# Patient Record
Sex: FEMALE | Race: WHITE
[De-identification: names, ages, dates, MRNs, and addresses within clinical notes are randomized per-mention and may not be internally consistent; named-entity substitution may affect disease eponyms.]

---

## 2018-01-16 ENCOUNTER — HOSPITAL ENCOUNTER (OUTPATIENT)
Dept: HOSPITAL 47 - EC | Age: 68
Setting detail: OBSERVATION
LOS: 2 days | Discharge: HOME | End: 2018-01-18
Attending: SURGERY | Admitting: SURGERY
Payer: MEDICARE

## 2018-01-16 VITALS — BODY MASS INDEX: 17.3 KG/M2

## 2018-01-16 DIAGNOSIS — K57.92: ICD-10-CM

## 2018-01-16 DIAGNOSIS — F17.200: ICD-10-CM

## 2018-01-16 DIAGNOSIS — K20.9: ICD-10-CM

## 2018-01-16 DIAGNOSIS — R82.90: ICD-10-CM

## 2018-01-16 DIAGNOSIS — F10.20: ICD-10-CM

## 2018-01-16 DIAGNOSIS — Z87.01: ICD-10-CM

## 2018-01-16 DIAGNOSIS — Z81.8: ICD-10-CM

## 2018-01-16 DIAGNOSIS — R07.81: ICD-10-CM

## 2018-01-16 DIAGNOSIS — Z88.6: ICD-10-CM

## 2018-01-16 DIAGNOSIS — R68.83: ICD-10-CM

## 2018-01-16 DIAGNOSIS — M54.31: ICD-10-CM

## 2018-01-16 DIAGNOSIS — R11.2: ICD-10-CM

## 2018-01-16 DIAGNOSIS — J44.9: ICD-10-CM

## 2018-01-16 DIAGNOSIS — M54.32: ICD-10-CM

## 2018-01-16 DIAGNOSIS — G89.29: ICD-10-CM

## 2018-01-16 DIAGNOSIS — M54.5: ICD-10-CM

## 2018-01-16 DIAGNOSIS — K29.60: ICD-10-CM

## 2018-01-16 DIAGNOSIS — R10.11: Primary | ICD-10-CM

## 2018-01-16 DIAGNOSIS — Z91.048: ICD-10-CM

## 2018-01-16 DIAGNOSIS — M19.90: ICD-10-CM

## 2018-01-16 DIAGNOSIS — E44.1: ICD-10-CM

## 2018-01-16 DIAGNOSIS — D72.829: ICD-10-CM

## 2018-01-16 LAB
ALBUMIN SERPL-MCNC: 4.8 G/DL (ref 3.5–5)
ALP SERPL-CCNC: 92 U/L (ref 38–126)
ALT SERPL-CCNC: 30 U/L (ref 9–52)
AMYLASE SERPL-CCNC: 47 U/L (ref 30–110)
ANION GAP SERPL CALC-SCNC: 15 MMOL/L
AST SERPL-CCNC: 24 U/L (ref 14–36)
BASOPHILS # BLD AUTO: 0 K/UL (ref 0–0.2)
BASOPHILS NFR BLD AUTO: 0 %
BUN SERPL-SCNC: 11 MG/DL (ref 7–17)
CALCIUM SPEC-MCNC: 10.3 MG/DL (ref 8.4–10.2)
CHLORIDE SERPL-SCNC: 101 MMOL/L (ref 98–107)
CO2 SERPL-SCNC: 22 MMOL/L (ref 22–30)
EOSINOPHIL # BLD AUTO: 0.1 K/UL (ref 0–0.7)
EOSINOPHIL NFR BLD AUTO: 1 %
ERYTHROCYTE [DISTWIDTH] IN BLOOD BY AUTOMATED COUNT: 5.23 M/UL (ref 3.8–5.4)
ERYTHROCYTE [DISTWIDTH] IN BLOOD: 14.2 % (ref 11.5–15.5)
GLUCOSE SERPL-MCNC: 128 MG/DL (ref 74–99)
HCT VFR BLD AUTO: 49.9 % (ref 34–46)
HGB BLD-MCNC: 16.1 GM/DL (ref 11.4–16)
HYALINE CASTS UR QL AUTO: 12 /LPF (ref 0–2)
KETONES UR QL STRIP.AUTO: (no result)
LIPASE SERPL-CCNC: 41 U/L (ref 23–300)
LYMPHOCYTES # SPEC AUTO: 1.2 K/UL (ref 1–4.8)
LYMPHOCYTES NFR SPEC AUTO: 13 %
MCH RBC QN AUTO: 30.9 PG (ref 25–35)
MCHC RBC AUTO-ENTMCNC: 32.3 G/DL (ref 31–37)
MCV RBC AUTO: 95.5 FL (ref 80–100)
MONOCYTES # BLD AUTO: 0.2 K/UL (ref 0–1)
MONOCYTES NFR BLD AUTO: 3 %
NEUTROPHILS # BLD AUTO: 7.6 K/UL (ref 1.3–7.7)
NEUTROPHILS NFR BLD AUTO: 83 %
PH UR: 6 [PH] (ref 5–8)
PLATELET # BLD AUTO: 288 K/UL (ref 150–450)
POTASSIUM SERPL-SCNC: 4.2 MMOL/L (ref 3.5–5.1)
PROT SERPL-MCNC: 8.1 G/DL (ref 6.3–8.2)
RBC UR QL: 29 /HPF (ref 0–5)
SODIUM SERPL-SCNC: 138 MMOL/L (ref 137–145)
SP GR UR: 1.01 (ref 1–1.03)
SQUAMOUS UR QL AUTO: 4 /HPF (ref 0–4)
UROBILINOGEN UR QL STRIP: <2 MG/DL (ref ?–2)
WBC # BLD AUTO: 9.2 K/UL (ref 3.8–10.6)
WBC #/AREA URNS HPF: 20 /HPF (ref 0–5)

## 2018-01-16 PROCEDURE — 43239 EGD BIOPSY SINGLE/MULTIPLE: CPT

## 2018-01-16 PROCEDURE — 74177 CT ABD & PELVIS W/CONTRAST: CPT

## 2018-01-16 PROCEDURE — 83690 ASSAY OF LIPASE: CPT

## 2018-01-16 PROCEDURE — 88342 IMHCHEM/IMCYTCHM 1ST ANTB: CPT

## 2018-01-16 PROCEDURE — 81001 URINALYSIS AUTO W/SCOPE: CPT

## 2018-01-16 PROCEDURE — 85025 COMPLETE CBC W/AUTO DIFF WBC: CPT

## 2018-01-16 PROCEDURE — 96376 TX/PRO/DX INJ SAME DRUG ADON: CPT

## 2018-01-16 PROCEDURE — 96374 THER/PROPH/DIAG INJ IV PUSH: CPT

## 2018-01-16 PROCEDURE — 96375 TX/PRO/DX INJ NEW DRUG ADDON: CPT

## 2018-01-16 PROCEDURE — 87040 BLOOD CULTURE FOR BACTERIA: CPT

## 2018-01-16 PROCEDURE — 80053 COMPREHEN METABOLIC PANEL: CPT

## 2018-01-16 PROCEDURE — 82150 ASSAY OF AMYLASE: CPT

## 2018-01-16 PROCEDURE — 88305 TISSUE EXAM BY PATHOLOGIST: CPT

## 2018-01-16 PROCEDURE — 74249: CPT

## 2018-01-16 PROCEDURE — 96361 HYDRATE IV INFUSION ADD-ON: CPT

## 2018-01-16 PROCEDURE — 99285 EMERGENCY DEPT VISIT HI MDM: CPT

## 2018-01-16 PROCEDURE — 83605 ASSAY OF LACTIC ACID: CPT

## 2018-01-16 PROCEDURE — 36415 COLL VENOUS BLD VENIPUNCTURE: CPT

## 2018-01-16 RX ADMIN — PANTOPRAZOLE SODIUM SCH MG: 40 INJECTION, POWDER, FOR SOLUTION INTRAVENOUS at 21:31

## 2018-01-16 RX ADMIN — HYDROMORPHONE HYDROCHLORIDE PRN MG: 1 INJECTION, SOLUTION INTRAMUSCULAR; INTRAVENOUS; SUBCUTANEOUS at 13:53

## 2018-01-16 RX ADMIN — HYDROMORPHONE HYDROCHLORIDE PRN MG: 1 INJECTION, SOLUTION INTRAMUSCULAR; INTRAVENOUS; SUBCUTANEOUS at 17:44

## 2018-01-16 RX ADMIN — HYDROMORPHONE HYDROCHLORIDE PRN MG: 1 INJECTION, SOLUTION INTRAMUSCULAR; INTRAVENOUS; SUBCUTANEOUS at 21:32

## 2018-01-16 RX ADMIN — CEFAZOLIN SCH MLS/HR: 330 INJECTION, POWDER, FOR SOLUTION INTRAMUSCULAR; INTRAVENOUS at 12:55

## 2018-01-16 RX ADMIN — PANTOPRAZOLE SODIUM SCH MG: 40 INJECTION, POWDER, FOR SOLUTION INTRAVENOUS at 14:30

## 2018-01-16 NOTE — P.GSHP
History of Present Illness


H&P Date: 18





67-year-old thin fragile female looking older than stated age underweight 

presented to the emergency room with a chief complaint of right upper quadrant 

abdominal pain.  Patient stated he had been ongoing for the past day.  Felt 

nauseated with vomiting.  Patient gives a history of having diverticulitis.  

Patient had a CAT scan of the abdomen and pelvic varices.  Mildly dilated 

duodenum stricture or mass needs to be considered.  Patient gives a history of 

alcohol consumption states that she drinks daily last drink was 48 hours ago.  

Patient states she has been having right lower rib area pain with tenderness.  

Patient states that she smokes marijuana daily and is an active current every 

day smoker.  Patient states it's been over 20 years since she's had a 

colonoscopy.





Past medical history COPD, osteoarthritis, chronic lower back pain.  Past 

surgical history orthopedic procedures.





- Review of Systems


Comment: 





Essentially unremarkable except as mentioned in the present illness





Past Medical History


Past Medical History: COPD, Osteoarthritis (OA), Pneumonia


Additional Past Medical History / Comment(s): Chronic low  back pain, DDD, 

bulging/herniated discs, sciatica bilaterally at times, diverticulitis, 

bilateral tinnitis at times, past R hip fracture after slip/fall on ice with 

surgery.


History of Any Multi-Drug Resistant Organisms: None Reported


Past Surgical History: Joint Replacement, Orthopedic Surgery


Additional Past Surgical History / Comment(s): R hip hemiarthroplasty, R knee 

arthroscopy, R/L hand cyst removals, bilateral carpal tunnel releases, 

colonoscopy-normal.


Past Anesthesia/Blood Transfusion Reactions: No Reported Reaction


Additional Past Anesthesia/Blood Transfusion Reaction / Comment(s): Pt has 

never received blood.


Smoking Status: Current every day smoker





- Past Family History


  ** Father


Additional Family Medical History / Comment(s): Father  of a head injury.





  ** Mother


Family Medical History: Dementia


Additional Family Medical History / Comment(s): Mother is 89yrs old and has 

dementia.





Medications and Allergies


 Home Medications











 Medication  Instructions  Recorded  Confirmed  Type


 


HYDROcodone/APAP 10-325MG [Norco 1 tab PO DAILY PRN 16 History





]    











 Allergies











Allergy/AdvReac Type Severity Reaction Status Date / Time


 


ibuprofen [From Motrin] AdvReac  Abdominal Verified 18 08:59





   Pain  


 


iodine AdvReac  Abdominal Verified 18 08:59





   Pain  














Surgical - Exam


 Vital Signs











Temp Pulse Resp BP Pulse Ox


 


 97.0 F L  102 H  20   222/115   97 


 


 18 08:31  18 08:31  18 08:31  18 08:31  18 08:31














GENERAL APPEARANCE: 67 -year-old female patient is alert, oriented,x 3  in no 

acute distress.


VITAL SIGNS: Reviewed


HEENT: Head is normocephalic and atraumatic. Pupils are equal and reactive. The 

nares are patent. Oropharynx is clear without lesions.


NECK: Supple without lymphadenopathy. Traches midline.


HEART: S1, S2. Regular rate and rhythm.  Denies chest pain no murmur


LUNGS: No crackles or wheezes are heard.  No shortness of breath


ABDOMEN: Soft, tenderness in the epigastric area nondistended with good bowel 

sounds. No peritoneal signs. No palpable organomegaly or masses.


EXTREMITIES: Normal skin color and turgor. No cyanosis, rash, ulceration, 

clubbing or edema. Radial pedal pulses are 2/4 bilaterally.


NEUROLOGICAL: No focal deficits. Strength and sensation are grossly intact.








Results





- Labs





 18 09:00





 18 09:00


 Abnormal Lab Results - Last 24 Hours (Table)











  18 Range/Units





  09:00 09:00 09:00 


 


Hgb   16.1 H   (11.4-16.0)  gm/dL


 


Hct   49.9 H   (34.0-46.0)  %


 


Glucose  128 H    (74-99)  mg/dL


 


Calcium  10.3 H    (8.4-10.2)  mg/dL


 


Urine Appearance    Cloudy H  (Clear)  


 


Urine Protein    Trace H  (Negative)  


 


Urine Ketones    2+ H  (Negative)  


 


Urine Blood    Moderate H  (Negative)  


 


Ur Leukocyte Esterase    Large H  (Negative)  


 


Urine RBC    29 H  (0-5)  /hpf


 


Urine WBC    20 H  (0-5)  /hpf


 


Urine Bacteria    Occasional H  (None)  /hpf


 


Hyaline Casts    12 H  (0-2)  /lpf


 


Urine Mucus    Few H  (None)  /hpf








 Diabetes panel











  18 Range/Units





  09:00 


 


Sodium  138  (137-145)  mmol/L


 


Potassium  4.2  (3.5-5.1)  mmol/L


 


Chloride  101  ()  mmol/L


 


Carbon Dioxide  22  (22-30)  mmol/L


 


BUN  11  (7-17)  mg/dL


 


Creatinine  0.68  (0.52-1.04)  mg/dL


 


Glucose  128 H  (74-99)  mg/dL


 


Calcium  10.3 H  (8.4-10.2)  mg/dL


 


AST  24  (14-36)  U/L


 


ALT  30  (9-52)  U/L


 


Alkaline Phosphatase  92  ()  U/L


 


Total Protein  8.1  (6.3-8.2)  g/dL


 


Albumin  4.8  (3.5-5.0)  g/dL








 Calcium panel











  18 Range/Units





  09:00 


 


Calcium  10.3 H  (8.4-10.2)  mg/dL


 


Albumin  4.8  (3.5-5.0)  g/dL








 Pituitary panel











  18 Range/Units





  09:00 


 


Sodium  138  (137-145)  mmol/L


 


Potassium  4.2  (3.5-5.1)  mmol/L


 


Chloride  101  ()  mmol/L


 


Carbon Dioxide  22  (22-30)  mmol/L


 


BUN  11  (7-17)  mg/dL


 


Creatinine  0.68  (0.52-1.04)  mg/dL


 


Glucose  128 H  (74-99)  mg/dL


 


Calcium  10.3 H  (8.4-10.2)  mg/dL








 Adrenal panel











  18 Range/Units





  09:00 


 


Sodium  138  (137-145)  mmol/L


 


Potassium  4.2  (3.5-5.1)  mmol/L


 


Chloride  101  ()  mmol/L


 


Carbon Dioxide  22  (22-30)  mmol/L


 


BUN  11  (7-17)  mg/dL


 


Creatinine  0.68  (0.52-1.04)  mg/dL


 


Glucose  128 H  (74-99)  mg/dL


 


Calcium  10.3 H  (8.4-10.2)  mg/dL


 


Total Bilirubin  1.2  (0.2-1.3)  mg/dL


 


AST  24  (14-36)  U/L


 


ALT  30  (9-52)  U/L


 


Alkaline Phosphatase  92  ()  U/L


 


Total Protein  8.1  (6.3-8.2)  g/dL


 


Albumin  4.8  (3.5-5.0)  g/dL














Assessment and Plan


Assessment: 





Impression


Present on admission right upper quadrant pain with nausea vomiting


Possible UTI


Daily alcohol consumption last drink 48 hours


Daily marijuana use


Current active tobacco abuse


CAT scan of the abdomen possible stricture in the DRM with a mass in the 

duodenum not ruled out











Plan


EGD will be planned in the morning


Home meds appropriate


DVT and GI prophylaxis


Clear liquid diet


Further recommendations after EGD 


Pain control


IV fluid for hydration


Dietitian consult underweight nutritional supplements





Dictating for Dr. keating





The above impression and plan of care have been discussed and directed by 

signing physician. Christy Kidd nurse practitioner acting as scribe for signing 

physician.

## 2018-01-16 NOTE — ED
General Adult HPI





- General


Chief complaint: Abdominal Pain


Stated complaint: Rt rib pain


Time Seen by Provider: 01/16/18 08:45


Source: patient, RN notes reviewed


Mode of arrival: wheelchair


Limitations: no limitations





- History of Present Illness


Initial comments: 





68 yo female presents to the ER with cc of right upper quadrant abdominal pain.

  She has had this since 8 AM yesterday.  She states she's had nausea vomiting.

  She has a history of diverticulitis and states it feels much like that.  She 

states she's been unable to keep really anything down.  She's been drinking 

water.  There's been no diarrhea.  She does not think that she's had a fever 

but she has had the chills.  Patient was concerned due to her continued 

symptoms so she thought that she should be evaluated.Patient denies any recent 

fever, chills, shortness of breath, chest pain, back pain, numbness or tingling

, dysuria or hematuria, constipation or diarrhea, headaches or visual changes, 

or any other current symptoms.





- Related Data


 Home Medications











 Medication  Instructions  Recorded  Confirmed


 


HYDROcodone/APAP 10-325MG [Norco 1 tab PO DAILY PRN 07/29/16 01/16/18





]   











 Allergies











Allergy/AdvReac Type Severity Reaction Status Date / Time


 


ibuprofen [From Motrin] AdvReac  Abdominal Verified 01/16/18 08:59





   Pain  


 


iodine AdvReac  Abdominal Verified 01/16/18 08:59





   Pain  














Review of Systems


ROS Statement: 


Those systems with pertinent positive or pertinent negative responses have been 

documented in the HPI.





ROS Other: All systems not noted in ROS Statement are negative.





Past Medical History


Additional Past Medical History / Comment(s): chronic back pain, diverticulitis


History of Any Multi-Drug Resistant Organisms: None Reported


Past Surgical History: Joint Replacement, Orthopedic Surgery


Past Psychological History: No Psychological Hx Reported


Smoking Status: Current every day smoker


Past Alcohol Use History: Daily


Past Drug Use History: None Reported





General Exam





- General Exam Comments


Initial Comments: 





General:  The patient is awake and alert, in no distress, and does not appear 

acutely ill. 


Eye:  Pupils are equal, round and reactive to light, extra-ocular movements are 

intact; there is normal conjunctiva bilaterally.  No signs of icterus.  


Ears, nose, mouth and throat:  There are moist mucous membranes.


Neck:  The neck is supple, there is no tenderness.


Cardiovascular:  There is a regular rate and rhythm. No murmur, rub or gallop 

is appreciated.


Respiratory:  Lungs are clear to auscultation, respirations are non-labored, 

breath sounds are equal.  No wheezes, stridor, rales, or rhonchi.


Gastrointestinal:  Soft, non-distended, mild tenderness right upper quadrant of 

the abdomen without masses or organomegaly noted. There is no rebound or 

guarding present.  No CVA tenderness. Bowel sounds are unremarkable.


Back:  There is no tenderness to palpation in the midline. There is no obvious 

deformity. No rashes noted. 


Musculoskeletal:  Normal ROM, no tenderness, There is no pedal edema. There is 

no calf tenderness or swelling. Sensation intact. Pulses equal bilaterally 2+.  


Neurological:  CN II-XII intact, There are no obvious motor or sensory 

deficits. Coordination appears grossly intact. Speech is normal.


Skin:  Skin is warm and dry and no rashes or lesions are noted. 


Psychiatric:  Cooperative, appropriate mood & affect, normal judgment.  





Limitations: no limitations





Course


 Vital Signs











  01/16/18 01/16/18 01/16/18





  08:31 08:35 09:12


 


Temperature 97.0 F L  


 


Pulse Rate 102 H  73


 


Respiratory 20  16





Rate   


 


Blood Pressure 222/115 219/102 225/105


 


O2 Sat by Pulse 97  96





Oximetry   














  01/16/18 01/16/18





  10:25 11:30


 


Temperature  


 


Pulse Rate 70 76


 


Respiratory 16 16





Rate  


 


Blood Pressure 199/99 198/95


 


O2 Sat by Pulse 99 98





Oximetry  














Medical Decision Making





- Medical Decision Making





67-year-old female presents emergency department with a chief complaint of 

abdominal pain with history of diverticulitis.  At this time patient's CAT scan 

is reviewed.  Urinalysis does show suspicion for UTI would give her Rocephin.  

She symptoms are most consistent with pyelonephritis however we will treat.  

Patient also does appear to have this to are not bloody.  We discussed the 

patient is could've been different etiologies.  We discussed the risk of cancer 

with this.  We did discuss that she does seem to be admitted to the hospital 

for continued care and evaluation.  We discussed we will continue antibiotics.  

We did discuss that due to the vomited there is concern this could be a partial 

obstruction with this.  Patient at this time was admitted to on-call care.  

[facility] accepts admission.





- Lab Data


Result diagrams: 


 01/16/18 09:00





 01/16/18 09:00


 Lab Results











  01/16/18 01/16/18 01/16/18 Range/Units





  09:00 09:00 09:00 


 


WBC   9.2   (3.8-10.6)  k/uL


 


RBC   5.23   (3.80-5.40)  m/uL


 


Hgb   16.1 H   (11.4-16.0)  gm/dL


 


Hct   49.9 H   (34.0-46.0)  %


 


MCV   95.5   (80.0-100.0)  fL


 


MCH   30.9   (25.0-35.0)  pg


 


MCHC   32.3   (31.0-37.0)  g/dL


 


RDW   14.2   (11.5-15.5)  %


 


Plt Count   288   (150-450)  k/uL


 


Neutrophils %   83   %


 


Lymphocytes %   13   %


 


Monocytes %   3   %


 


Eosinophils %   1   %


 


Basophils %   0   %


 


Neutrophils #   7.6   (1.3-7.7)  k/uL


 


Lymphocytes #   1.2   (1.0-4.8)  k/uL


 


Monocytes #   0.2   (0-1.0)  k/uL


 


Eosinophils #   0.1   (0-0.7)  k/uL


 


Basophils #   0.0   (0-0.2)  k/uL


 


Sodium  138    (137-145)  mmol/L


 


Potassium  4.2    (3.5-5.1)  mmol/L


 


Chloride  101    ()  mmol/L


 


Carbon Dioxide  22    (22-30)  mmol/L


 


Anion Gap  15    mmol/L


 


BUN  11    (7-17)  mg/dL


 


Creatinine  0.68    (0.52-1.04)  mg/dL


 


Est GFR (MDRD) Af Amer  >60    (>60 ml/min/1.73 sqM)  


 


Est GFR (MDRD) Non-Af  >60    (>60 ml/min/1.73 sqM)  


 


Glucose  128 H    (74-99)  mg/dL


 


Plasma Lactic Acid Zak    1.3  (0.7-2.0)  mmol/L


 


Calcium  10.3 H    (8.4-10.2)  mg/dL


 


Total Bilirubin  1.2    (0.2-1.3)  mg/dL


 


AST  24    (14-36)  U/L


 


ALT  30    (9-52)  U/L


 


Alkaline Phosphatase  92    ()  U/L


 


Total Protein  8.1    (6.3-8.2)  g/dL


 


Albumin  4.8    (3.5-5.0)  g/dL


 


Amylase  47    ()  U/L


 


Lipase  41    ()  U/L


 


Urine Color     


 


Urine Appearance     (Clear)  


 


Urine pH     (5.0-8.0)  


 


Ur Specific Gravity     (1.001-1.035)  


 


Urine Protein     (Negative)  


 


Urine Glucose (UA)     (Negative)  


 


Urine Ketones     (Negative)  


 


Urine Blood     (Negative)  


 


Urine Nitrite     (Negative)  


 


Urine Bilirubin     (Negative)  


 


Urine Urobilinogen     (<2.0)  mg/dL


 


Ur Leukocyte Esterase     (Negative)  


 


Urine RBC     (0-5)  /hpf


 


Urine WBC     (0-5)  /hpf


 


Ur Squamous Epith Cells     (0-4)  /hpf


 


Urine Bacteria     (None)  /hpf


 


Hyaline Casts     (0-2)  /lpf


 


Urine Mucus     (None)  /hpf














  01/16/18 Range/Units





  09:00 


 


WBC   (3.8-10.6)  k/uL


 


RBC   (3.80-5.40)  m/uL


 


Hgb   (11.4-16.0)  gm/dL


 


Hct   (34.0-46.0)  %


 


MCV   (80.0-100.0)  fL


 


MCH   (25.0-35.0)  pg


 


MCHC   (31.0-37.0)  g/dL


 


RDW   (11.5-15.5)  %


 


Plt Count   (150-450)  k/uL


 


Neutrophils %   %


 


Lymphocytes %   %


 


Monocytes %   %


 


Eosinophils %   %


 


Basophils %   %


 


Neutrophils #   (1.3-7.7)  k/uL


 


Lymphocytes #   (1.0-4.8)  k/uL


 


Monocytes #   (0-1.0)  k/uL


 


Eosinophils #   (0-0.7)  k/uL


 


Basophils #   (0-0.2)  k/uL


 


Sodium   (137-145)  mmol/L


 


Potassium   (3.5-5.1)  mmol/L


 


Chloride   ()  mmol/L


 


Carbon Dioxide   (22-30)  mmol/L


 


Anion Gap   mmol/L


 


BUN   (7-17)  mg/dL


 


Creatinine   (0.52-1.04)  mg/dL


 


Est GFR (MDRD) Af Amer   (>60 ml/min/1.73 sqM)  


 


Est GFR (MDRD) Non-Af   (>60 ml/min/1.73 sqM)  


 


Glucose   (74-99)  mg/dL


 


Plasma Lactic Acid Zak   (0.7-2.0)  mmol/L


 


Calcium   (8.4-10.2)  mg/dL


 


Total Bilirubin   (0.2-1.3)  mg/dL


 


AST   (14-36)  U/L


 


ALT   (9-52)  U/L


 


Alkaline Phosphatase   ()  U/L


 


Total Protein   (6.3-8.2)  g/dL


 


Albumin   (3.5-5.0)  g/dL


 


Amylase   ()  U/L


 


Lipase   ()  U/L


 


Urine Color  Light Red  


 


Urine Appearance  Cloudy H  (Clear)  


 


Urine pH  6.0  (5.0-8.0)  


 


Ur Specific Gravity  1.014  (1.001-1.035)  


 


Urine Protein  Trace H  (Negative)  


 


Urine Glucose (UA)  Negative  (Negative)  


 


Urine Ketones  2+ H  (Negative)  


 


Urine Blood  Moderate H  (Negative)  


 


Urine Nitrite  Negative  (Negative)  


 


Urine Bilirubin  Negative  (Negative)  


 


Urine Urobilinogen  <2.0  (<2.0)  mg/dL


 


Ur Leukocyte Esterase  Large H  (Negative)  


 


Urine RBC  29 H  (0-5)  /hpf


 


Urine WBC  20 H  (0-5)  /hpf


 


Ur Squamous Epith Cells  4  (0-4)  /hpf


 


Urine Bacteria  Occasional H  (None)  /hpf


 


Hyaline Casts  12 H  (0-2)  /lpf


 


Urine Mucus  Few H  (None)  /hpf














- Radiology Data


Radiology results: report reviewed, image reviewed





Disposition


Clinical Impression: 


 UTI (urinary tract infection), Duodenal mass





Disposition: ADMITTED AS IP TO [facility]


Condition: Stable


Referrals: 


None,Stated [Primary Care Provider] - 1-2 days


Time of Disposition: 11:18


Decision Date: 01/16/18


Decision Time: 11:18

## 2018-01-16 NOTE — P.CONS
History of Present Illness





- Reason for Consult


Possible urinary tract infection and peptic ulcer disease





- History of Present Illness





68 yo female presents to the ER with cc of right upper quadrant abdominal pain.

  She has had this since 8 AM yesterday.  She states she's had nausea vomiting.

  She has a history of diverticulitis and states it feels much like that.  She 

states she's been unable to keep really anything down.  She's been drinking 

water.  There's been no diarrhea.  She does not think that she's had a fever 

but she has had the chills.   Patient's pain  is in the right lower rib cage 

area and there is tenderness in that area, pain is about 7 x 10 now sharp in 

nature radiating to the back much worse earlier today.





Patient had a CAT scan of the abdomen which showed possible stricture in the 

DRM are a mass in the duodenum with proximal dilated patient.  Patient was 

started on Protonix patient will continue on IV fluids patient denied any sick 

and weight loss there was suspicion of or pyelonephritis on the CAT scan of the 

right lower pole of the kidney although there is no significant 70 evidence of 

urinary tract infection urine has elevated white blood cell count nitrate is 

negative leukocyte esterase is positive.  Patient denied any dysuria suprapubic 

pain, since I cannot completely rule out urinary tract infection and started on 

Rocephin which will be continued.





Review of Systems


REVIEW OF SYSTEMS: 


CONSTITUTIONAL: No fever, no malaise, no fatigue. 


HEENT: No recent visual problems or hearing problems. Denied any sore throat. 


CARDIOVASCULAR: No chest pain, orthopnea, PND, no palpitations, no syncope. 


PULMONARY: No shortness of breath, no cough, no hemoptysis. 


GASTROINTESTINAL: As mentioned in HPI


NEUROLOGICAL: No headaches, no weakness, no numbness. 


HEMATOLOGICAL: Denies any bleeding or petechiae. 


GENITOURINARY: Denies any burning micturition, frequency, or urgency. 


MUSCULOSKELETAL/RHEUMATOLOGICAL: Denies any joint pain, swelling, or any muscle 

pain. 


ENDOCRINE: Denies any polyuria or polydipsia. 





The rest of the 14-point review of systems is negative.








Past Medical History


Past Medical History: COPD, Osteoarthritis (OA), Pneumonia


Additional Past Medical History / Comment(s): Chronic low  back pain, DDD, 

bulging/herniated discs, sciatica bilaterally at times, diverticulitis, 

bilateral tinnitis at times, past R hip fracture after slip/fall on ice with 

surgery.


History of Any Multi-Drug Resistant Organisms: None Reported


Past Surgical History: Joint Replacement, Orthopedic Surgery


Additional Past Surgical History / Comment(s): R hip hemiarthroplasty, R knee 

arthroscopy, R/L hand cyst removals, bilateral carpal tunnel releases, 

colonoscopy-normal.


Past Anesthesia/Blood Transfusion Reactions: No Reported Reaction


Additional Past Anesthesia/Blood Transfusion Reaction / Comm: Pt has never 

received blood.


Smoking Status: Current every day smoker





- Past Family History


  ** Father


Additional Family Medical History / Comment(s): Father  of a head injury.





  ** Mother


Family Medical History: Dementia


Additional Family Medical History / Comment(s): Mother is 89yrs old and has 

dementia.





Medications and Allergies


 Home Medications











 Medication  Instructions  Recorded  Confirmed  Type


 


HYDROcodone/APAP 10-325MG [Norco 1 tab PO DAILY PRN 16 History





]    











 Allergies











Allergy/AdvReac Type Severity Reaction Status Date / Time


 


ibuprofen [From Motrin] AdvReac  Abdominal Verified 18 08:59





   Pain  


 


iodine AdvReac  Abdominal Verified 18 08:59





   Pain  














Physical Exam


Vitals: 


 Vital Signs











  Temp Pulse Resp BP Pulse Ox


 


 18 12:28  98.4 F  71  16  183/89  98


 


 18 11:30   76  16  198/95  98


 


 18 10:25   70  16  199/99  99


 


 18 09:12   73  16  225/105  96


 


 18 08:35     219/102 


 


 18 08:31  97.0 F L  102 H  20  222/115  97








 Intake and Output











 01/15/18 01/16/18 01/16/18





 22:59 06:59 14:59


 


Other:   


 


  Weight   45.813 kg








 Patient Weight











 18





 06:59


 


Weight 45.813 kg














PHYSICAL EXAMINATION: 





GENERAL: The patient is alert and oriented x3, not in any acute distress.  Thin 

built female


HEENT: Pupils are round and equally reacting to light. EOMI. No scleral 

icterus. No conjunctival pallor. Normocephalic, atraumatic. No pharyngeal 

erythema. No thyromegaly. 


CARDIOVASCULAR: S1 and S2 present. No murmurs, rubs, or gallops. 


PULMONARY: Chest is clear to auscultation, no wheezing or crackles. 


ABDOMEN: Soft,  nondistended, normoactive bowel sounds. No palpable 

organomegaly.  Patient has tenderness in the right lower rib cage area


MUSCULOSKELETAL: No joint swelling or deformity.


EXTREMITIES: No cyanosis, clubbing, or pedal edema. 


NEUROLOGICAL: Gross neurological examination did not reveal any focal deficits. 


SKIN: No rashes. 








Results


CBC & Chem 7: 


 18 09:00





 18 09:00


Labs: 


 Abnormal Lab Results - Last 24 Hours (Table)











  18 Range/Units





  09:00 09:00 09:00 


 


Hgb   16.1 H   (11.4-16.0)  gm/dL


 


Hct   49.9 H   (34.0-46.0)  %


 


Glucose  128 H    (74-99)  mg/dL


 


Calcium  10.3 H    (8.4-10.2)  mg/dL


 


Urine Appearance    Cloudy H  (Clear)  


 


Urine Protein    Trace H  (Negative)  


 


Urine Ketones    2+ H  (Negative)  


 


Urine Blood    Moderate H  (Negative)  


 


Ur Leukocyte Esterase    Large H  (Negative)  


 


Urine RBC    29 H  (0-5)  /hpf


 


Urine WBC    20 H  (0-5)  /hpf


 


Urine Bacteria    Occasional H  (None)  /hpf


 


Hyaline Casts    12 H  (0-2)  /lpf


 


Urine Mucus    Few H  (None)  /hpf














Assessment and Plan


Plan: 


-Abdominal pain nausea vomiting: Probably peptic ulcer disease with a stricture 

patient was started on Protonix further management as per primary surgical 

services.


-Possibility of urinary tract infection cannot be ruled out urine cultures were 

obtained patient was started on Rocephin.


-Nicotine abuse history: Counseling was provided

## 2018-01-17 VITALS — RESPIRATION RATE: 16 BRPM

## 2018-01-17 LAB
ALBUMIN SERPL-MCNC: 4 G/DL (ref 3.5–5)
ALP SERPL-CCNC: 62 U/L (ref 38–126)
ALT SERPL-CCNC: 28 U/L (ref 9–52)
ANION GAP SERPL CALC-SCNC: 8 MMOL/L
AST SERPL-CCNC: 21 U/L (ref 14–36)
BASOPHILS # BLD AUTO: 0 K/UL (ref 0–0.2)
BASOPHILS NFR BLD AUTO: 0 %
BUN SERPL-SCNC: 12 MG/DL (ref 7–17)
CALCIUM SPEC-MCNC: 9.6 MG/DL (ref 8.4–10.2)
CHLORIDE SERPL-SCNC: 102 MMOL/L (ref 98–107)
CO2 SERPL-SCNC: 30 MMOL/L (ref 22–30)
EOSINOPHIL # BLD AUTO: 0 K/UL (ref 0–0.7)
EOSINOPHIL NFR BLD AUTO: 0 %
ERYTHROCYTE [DISTWIDTH] IN BLOOD BY AUTOMATED COUNT: 4.5 M/UL (ref 3.8–5.4)
ERYTHROCYTE [DISTWIDTH] IN BLOOD: 12.6 % (ref 11.5–15.5)
GLUCOSE SERPL-MCNC: 102 MG/DL (ref 74–99)
HCT VFR BLD AUTO: 43.9 % (ref 34–46)
HGB BLD-MCNC: 14 GM/DL (ref 11.4–16)
LYMPHOCYTES # SPEC AUTO: 2.4 K/UL (ref 1–4.8)
LYMPHOCYTES NFR SPEC AUTO: 26 %
MCH RBC QN AUTO: 31.1 PG (ref 25–35)
MCHC RBC AUTO-ENTMCNC: 31.9 G/DL (ref 31–37)
MCV RBC AUTO: 97.5 FL (ref 80–100)
MONOCYTES # BLD AUTO: 0.6 K/UL (ref 0–1)
MONOCYTES NFR BLD AUTO: 7 %
NEUTROPHILS # BLD AUTO: 6.2 K/UL (ref 1.3–7.7)
NEUTROPHILS NFR BLD AUTO: 65 %
PLATELET # BLD AUTO: 265 K/UL (ref 150–450)
POTASSIUM SERPL-SCNC: 4.1 MMOL/L (ref 3.5–5.1)
PROT SERPL-MCNC: 6.9 G/DL (ref 6.3–8.2)
SODIUM SERPL-SCNC: 140 MMOL/L (ref 137–145)
WBC # BLD AUTO: 9.5 K/UL (ref 3.8–10.6)

## 2018-01-17 RX ADMIN — HYDROMORPHONE HYDROCHLORIDE PRN MG: 1 INJECTION, SOLUTION INTRAMUSCULAR; INTRAVENOUS; SUBCUTANEOUS at 08:24

## 2018-01-17 RX ADMIN — HYDROMORPHONE HYDROCHLORIDE PRN MG: 1 INJECTION, SOLUTION INTRAMUSCULAR; INTRAVENOUS; SUBCUTANEOUS at 04:56

## 2018-01-17 RX ADMIN — HYDROMORPHONE HYDROCHLORIDE PRN MG: 1 INJECTION, SOLUTION INTRAMUSCULAR; INTRAVENOUS; SUBCUTANEOUS at 14:27

## 2018-01-17 RX ADMIN — HYDROMORPHONE HYDROCHLORIDE PRN MG: 1 INJECTION, SOLUTION INTRAMUSCULAR; INTRAVENOUS; SUBCUTANEOUS at 11:42

## 2018-01-17 RX ADMIN — PANTOPRAZOLE SODIUM SCH MG: 40 INJECTION, POWDER, FOR SOLUTION INTRAVENOUS at 08:20

## 2018-01-17 RX ADMIN — HYDROMORPHONE HYDROCHLORIDE PRN MG: 1 INJECTION, SOLUTION INTRAMUSCULAR; INTRAVENOUS; SUBCUTANEOUS at 21:07

## 2018-01-17 RX ADMIN — CEFTRIAXONE SODIUM SCH MG: 1 INJECTION, POWDER, FOR SOLUTION INTRAMUSCULAR; INTRAVENOUS at 11:40

## 2018-01-17 RX ADMIN — CEFAZOLIN SCH MLS/HR: 330 INJECTION, POWDER, FOR SOLUTION INTRAMUSCULAR; INTRAVENOUS at 00:04

## 2018-01-17 RX ADMIN — CEFAZOLIN SCH MLS/HR: 330 INJECTION, POWDER, FOR SOLUTION INTRAMUSCULAR; INTRAVENOUS at 09:06

## 2018-01-17 RX ADMIN — HYDROMORPHONE HYDROCHLORIDE PRN MG: 1 INJECTION, SOLUTION INTRAMUSCULAR; INTRAVENOUS; SUBCUTANEOUS at 17:28

## 2018-01-17 RX ADMIN — CEFAZOLIN SCH MLS/HR: 330 INJECTION, POWDER, FOR SOLUTION INTRAMUSCULAR; INTRAVENOUS at 17:14

## 2018-01-17 RX ADMIN — HYDROMORPHONE HYDROCHLORIDE PRN MG: 1 INJECTION, SOLUTION INTRAMUSCULAR; INTRAVENOUS; SUBCUTANEOUS at 00:11

## 2018-01-17 RX ADMIN — PANTOPRAZOLE SODIUM SCH MG: 40 INJECTION, POWDER, FOR SOLUTION INTRAVENOUS at 21:07

## 2018-01-17 NOTE — P.PN
Subjective


Patient was admitted for abdominal pain found to have some duodenal stricture 

or peptic ulcer disease.  Patient is feeling better is going undergoing  upper 

GI endoscopy after that patient probably will be discharged.  Still complaining 

of same some pain in the abdomen.  Patient is nothing by mouth at this time





Constitutional: Denied any fatigue denied any fever.


Cardio vascular: denied any chest pain, palpitations


Gastrointestinal as mentioned in HPI


Pulmonary: Denied any shortness of breath cough


Neurologic denied any new focal deficits








Objective





- Vital Signs


Vital signs: 


 Vital Signs











Temp  97.6 F   01/17/18 06:22


 


Pulse  65   01/17/18 06:22


 


Resp  16   01/17/18 06:22


 


BP  149/83   01/17/18 06:22


 


Pulse Ox  96   01/17/18 06:22








 Intake & Output











 01/16/18 01/17/18 01/17/18





 18:59 06:59 18:59


 


Weight 45.813 kg 46.5 kg 


 


Other:   


 


  Voiding Method  Toilet 


 


  # Voids 1 1 


 


  # Bowel Movements 0  














- Exam


PHYSICAL EXAMINATION: 





GENERAL: The patient is alert and oriented x3, not in any acute distress.  Thin 

built female


HEENT: Pupils are round and equally reacting to light. EOMI. No scleral 

icterus. No conjunctival pallor. Normocephalic, atraumatic. No pharyngeal 

erythema. No thyromegaly. 


CARDIOVASCULAR: S1 and S2 present. No murmurs, rubs, or gallops. 


PULMONARY: Chest is clear to auscultation, no wheezing or crackles. 


ABDOMEN: Soft,  nondistended, normoactive bowel sounds. No palpable 

organomegaly.  Patient has tenderness in the right lower rib cage area


MUSCULOSKELETAL: No joint swelling or deformity.


EXTREMITIES: No cyanosis, clubbing, or pedal edema. 


NEUROLOGICAL: Gross neurological examination did not reveal any focal deficits. 


SKIN: No rashes. 








- Labs


CBC & Chem 7: 


 01/17/18 07:29





 01/17/18 07:29


Labs: 


 Abnormal Lab Results - Last 24 Hours (Table)











  01/17/18 Range/Units





  07:29 


 


Glucose  102 H  (74-99)  mg/dL














Assessment and Plan


Plan: 


-Abdominal pain nausea vomiting: Probably peptic ulcer disease with a stricture 


-Possibility of urinary tract infection cannot be ruled out be discharged on 30 

days of ciprofloxacin 500 twice a day


-Nicotine abuse history: Counseling was provided

## 2018-01-17 NOTE — P.OP
Date of Procedure: 01/17/18


Preoperative Diagnosis: 


Dysphagia, peptic ulcer disease


Postoperative Diagnosis: 


Mild antral gastritis





No evidence of hiatal hernia





Mild esophagitis


Procedure(s) Performed: 


EGD


Anesthesia: MAC


Surgeon: Miguel Wong


Pathology: other (Antrum, esophagus)


Condition: stable


Disposition: PACU


Description of Procedure: 


The patient's placed on the endoscopy table in the lateral position.  She 

received IV sedation.  The gastroscope placed oropharynx and passed in the 

esophagus and stomach.  Scope was then placed through the pylorus.  The first 

and second portion of the duodenum.  Scope was then advanced up to the 70 cm 

dannielle and no obvious duodenal tumor could be seen.  Scope was then withdrawn the 

antrum appeared mildly inflamed.  A biopsies performed.  The scope was then 

retroflexed and remainder stomach appeared normal.  There is no evidence of a 

hiatal hernia.  GE junction was at 47 is.  The distal esophagus was minimal 

inflamed and a biopsies performed.  The proximal esophagus appeared normal.  

Scope was withdrawn from patient.

## 2018-01-17 NOTE — CDI
Last Revision, December 2017





Documentation Clarification Form



Date: 1/17/2018 8:27:00 AM

From: Estrella AsifSanchezGENIA, CCDS

Phone: [phone]

MRN: [id number]

Admit Date: 1/16/2018 11:40:00 AM

Patient Name: Lizzie Frank

Visit Number: [id number]

Discharge Date:



ATTENTION: The Clinical Documentation Specialists (CDI) and [facility] Coding Staff 
appreciate your assistance in clarifying documentation. Please respond to the 
clarification below the line at the bottom and electronically sign. The CDI & 
[facility] Coding staff will review the response and follow-up if needed. Please note: 
Queries are made part of the Legal Health Record. If you have any questions, 
please contact the author of this message via ITS.

Dr. Miguel Wong:



Presented with abdominal pain, nausea, vomiting, possible PUD and stricture or 
mass of duodenum.

Per H/P: Looks older than stated age, underweight.

  

History/Risk Factors: Diverticulitis, daily alcohol consumption, smokes 
marijuana daily, tobacco smoker.

Clinical Indicators:  

Musculoskeletal assessment: muscle tone: weak, stiff.

Labs: UA: positive for UTI.

Current BMI: 17.6



Treatment: po nutrition supplements, dietitian consult for malnutrition. IV 
fluid bolus, IV Rocephin, IV Protonix.



In your professional opinion, can you please clarify if these findings signify 
one of the following conditions? 

    Mild Protein Malnutrition 

    Mild Protein-Calorie Malnutrition 

    Moderate Protein Malnutrition

    Moderate Protein-Calorie Malnutrition

    Severe Protein Malnutrition

    Severe Protein-Calorie Malnutrition

    Other condition, please specify

    Unable to determine



Please continue to document in your progress notes and discharge summary in 
order to capture severity of illness and risk of mortality. Include clinical 
findings that support your diagnosis.

MTDD

## 2018-01-18 VITALS — SYSTOLIC BLOOD PRESSURE: 148 MMHG | HEART RATE: 66 BPM | DIASTOLIC BLOOD PRESSURE: 78 MMHG | TEMPERATURE: 97.2 F

## 2018-01-18 RX ADMIN — PANTOPRAZOLE SODIUM SCH MG: 40 INJECTION, POWDER, FOR SOLUTION INTRAVENOUS at 10:57

## 2018-01-18 RX ADMIN — CEFTRIAXONE SODIUM SCH MG: 1 INJECTION, POWDER, FOR SOLUTION INTRAMUSCULAR; INTRAVENOUS at 11:26

## 2018-01-18 RX ADMIN — HYDROMORPHONE HYDROCHLORIDE PRN MG: 1 INJECTION, SOLUTION INTRAMUSCULAR; INTRAVENOUS; SUBCUTANEOUS at 05:09

## 2018-01-18 RX ADMIN — CEFAZOLIN SCH MLS/HR: 330 INJECTION, POWDER, FOR SOLUTION INTRAMUSCULAR; INTRAVENOUS at 04:26

## 2018-01-18 RX ADMIN — HYDROMORPHONE HYDROCHLORIDE PRN MG: 1 INJECTION, SOLUTION INTRAMUSCULAR; INTRAVENOUS; SUBCUTANEOUS at 00:48

## 2018-01-18 NOTE — P.DS
Providers


Date of admission: 


01/16/18 11:40





Expected date of discharge: 01/18/18


Attending physician: 


Miguel Keating





Consults: 





 





01/16/18 11:41


Consult Physician Routine 


   Consulting Provider: Shantel Boateng


   Consult Reason/Comments: medical


   Do you want consulting provider notified?: Yes











Primary care physician: 


Stated None





Hospital Course: 





67-year-old female presented to the emergency room with right upper quadrant 

abdominal pain.  Admitted to the services of the attending.  Patient stated 

that she had persistent nausea sensation with vomiting.  Patient did undergo 

CAT scan of the abdomen and pelvis it did show varices with some mild dilated 

duodenum stricture or mass needed to be considered.  Patient does have a 

history of alcohol consumption states she drinks daily.  Also patient was 

reportedly experiencing right lower rib pain.  The patient did undergo an EGD 

on January 17 per Dr. keating .  It showed mild antral gastritis, no evidence 

of a hernia, mild esophagitis.  Biopsies were obtained.  Patient was followed 

by medicine service placed on a short course of antibiotics Cipro for possible 

UTI that could not be ruled out.  Patient was counseled to stop drinking 

alcohol and nicotine cessation information provided.  On the day of discharge 

patient was felt to be medically stable and appropriate to proceed with a 

discharge to home





Impression discharge diagnosis


Mild protein calorie malnutrition underweight BMI 18 suspect due to poor 

caloric intake related to alcoholism


Present on admission right upper quadrant pain with nausea vomiting likely due 

to gastroenteritis


Possible UTI abnormal urinalysis could not be ruled out


Daily alcohol consumption last drink 48 hours


Daily marijuana use


Current active tobacco abuse


EGD on the 17th showed no evidence of a hiatal hernia, mild atrophic gastritis, 

mild esophagitis





The above impression and plan of care have been discussed and directed by 

signing physician. Christy Kidd nurse practitioner acting as scribe for signing 

physician.


Patient Condition at Discharge: Stable





Plan - Discharge Summary


Discharge Rx Participant: No


New Discharge Prescriptions: 


New


   Omeprazole [PriLOSEC] 40 mg PO AC-BRKFST #30 capsule.


   Ciprofloxacin HCl [Cipro] 500 mg PO Q12HR #6 tablet





No Action


   HYDROcodone/APAP 10-325MG [Norco ] 1 tab PO DAILY PRN


     PRN Reason: Pain


Discharge Medication List





HYDROcodone/APAP 10-325MG [Norco ] 1 tab PO DAILY PRN 07/29/16 [History]


Ciprofloxacin HCl [Cipro] 500 mg PO Q12HR #6 tablet 01/17/18 [Rx]


Omeprazole [PriLOSEC] 40 mg PO AMITA-BRKFST #30 capsule. 01/17/18 [Rx]








Follow up Appointment(s)/Referral(s): 


Ja Marcum MD [STAFF PHYSICIAN] - 01/24/18 2:00 pm


Miguel Keating MD [STAFF PHYSICIAN] - 01/25/18 3:00 pm


Patient Instructions/Handouts:  Urinary Tract Infection in Women (DC), Acute 

Abdominal Pain (DC)


Activity/Diet/Wound Care/Special Instructions: 


NO smoking, cessation information provided.





Regular diet.





Activity as tolerated. 





Discharge Disposition: HOME SELF-CARE

## 2018-01-18 NOTE — P.PN
Subjective


Patient was admitted for abdominal pain found to have some duodenal stricture 

or peptic ulcer disease.  Patient is feeling better is going undergoing  upper 

GI endoscopy after that patient probably will be discharged.  Still complaining 

of same some pain in the abdomen.  Patient is nothing by mouth at this time





01/18/2018


Patient symptoms resolved patient is clinically doing well and will be 

discharged today patient underwent upper GI endoscopy which showed gastritis 

esophagitis and sliding had a hernia no obstruction or mass was seen in the the 

abdomen him and patient also underwent the upper GI imaging all of which is 

negative





Constitutional: Denied any fatigue denied any fever.


Cardio vascular: denied any chest pain, palpitations


Gastrointestinal as mentioned in HPI


Pulmonary: Denied any shortness of breath cough


Neurologic denied any new focal deficits








Objective





- Vital Signs


Vital signs: 


 Vital Signs











Temp  97.2 F L  01/18/18 07:00


 


Pulse  66   01/18/18 07:00


 


Resp  16   01/18/18 07:00


 


BP  148/78   01/18/18 07:00


 


Pulse Ox  98   01/18/18 07:00








 Intake & Output











 01/17/18 01/18/18 01/18/18





 18:59 06:59 18:59


 


Intake Total 100 200 


 


Balance 100 200 


 


Weight  48 kg 


 


Intake:   


 


    


 


  Oral  200 


 


Other:   


 


  Voiding Method  Toilet 


 


  # Voids 2 1 














- Exam


PHYSICAL EXAMINATION: 





GENERAL: The patient is alert and oriented x3, not in any acute distress.  Thin 

built female


HEENT: Pupils are round and equally reacting to light. EOMI. No scleral 

icterus. No conjunctival pallor. Normocephalic, atraumatic. No pharyngeal 

erythema. No thyromegaly. 


CARDIOVASCULAR: S1 and S2 present. No murmurs, rubs, or gallops. 


PULMONARY: Chest is clear to auscultation, no wheezing or crackles. 


ABDOMEN: Soft,  nondistended, normoactive bowel sounds. No palpable 

organomegaly.  Patient has tenderness in the right lower rib cage area


MUSCULOSKELETAL: No joint swelling or deformity.


EXTREMITIES: No cyanosis, clubbing, or pedal edema. 


NEUROLOGICAL: Gross neurological examination did not reveal any focal deficits. 


SKIN: No rashes. 








- Labs


CBC & Chem 7: 


 01/17/18 07:29





 01/17/18 07:29


Labs: 


 Microbiology - Last 24 Hours (Table)











 01/16/18 09:00 Blood Culture - Preliminary





 Blood    No Growth after 24 hours














Assessment and Plan


Plan: 


-Abdominal pain nausea vomiting: Probably peptic ulcer disease, gastritis.  

Will be discharged on Prilosec


-Possibility of urinary tract infection cannot be ruled out be discharged on 3 

days of ciprofloxacin 500 twice a day


-Nicotine abuse history: Counseling was provided

## 2018-01-18 NOTE — FL
EXAMINATION TYPE: FL UGI air w esoph w sm bowel

 

DATE OF EXAM: 1/18/2018 9:34 AM

 

COMPARISON: NONE

 

CLINICAL HISTORY: Difficulty in swallowing.

 

 

 

Upper GI examination was performed according to the air contrast technique.  Barium and effervescent 
crystal was swallowed without difficulty or delay.  Esophageal peristalsis and motility are within no
rmal limits.  There is no evidence for esophagitis, intraluminal mass or gastroesophageal reflux. Sma
ll reducible sliding type hiatal hernia is noted. 

 

The stomach has a normal appearance in terms of its size, shape and location.  No gastric filling def
ects or ulcer craters are seen.  The duodenal bulb and sweep are also free of intraluminal lesion or 
ulcer crater.

 

A single contrast cervical esophagram was also performed following the ingestion of thin liquid bariu
m.  There is no evidence for aspiration or penetration.  No masses are seen.  No filling defects are 
evident.  Prominence of the cricopharyngeal musculature.

 

Small bowel follow-through was performed with transit time of approximately 30 minutes. There is no e
vidence for obstruction. Small bowel loops are of normal caliber. Jejunal folds are mildly prominent 
with  rapid transit time.

No evidence for intra or extraluminal mass. Terminal ileum has a normal appearance.

 

IMPRESSION:

1. Small sliding-type hiatal hernia.

2. No evidence for jejunal obstruction or small bowel obstruction at this time. Jejunal folds are mil
dly prominent with  rapid transit time.

## 2018-02-01 ENCOUNTER — HOSPITAL ENCOUNTER (OUTPATIENT)
Dept: HOSPITAL 47 - RADUSMAIN | Age: 68
Discharge: HOME | End: 2018-02-01
Attending: SURGERY
Payer: MEDICARE

## 2018-02-01 DIAGNOSIS — Z88.8: ICD-10-CM

## 2018-02-01 DIAGNOSIS — Z88.6: ICD-10-CM

## 2018-02-01 DIAGNOSIS — R10.11: Primary | ICD-10-CM

## 2018-02-01 PROCEDURE — 76705 ECHO EXAM OF ABDOMEN: CPT

## 2018-02-01 PROCEDURE — 78227 HEPATOBIL SYST IMAGE W/DRUG: CPT

## 2018-02-01 NOTE — NM
EXAMINATION TYPE: NM hepatobiliary w CCK

 

DATE OF EXAM: 2/1/2018

 

COMPARISON: NONE

 

HISTORY: Abdominal pain

 

TECHNIQUE: After the intravenous administration of 5.21 mCi Tc 99m Mebrofenin hepatobiliary scintigra
phy is performed.  Immediate images post injection.

 

FINDINGS: 

There is prompt uptake of the tracer by the liver. Liver is upper limit of normal size. I see no foca
l defect. There is tracer in the small bowel at 10 minutes. Tracer is mostly cleared from the liver a
t one hour. There is no evidence of uptake of the tracer in the gallbladder.

 

This patient has a gallbladder on the CT scan of 1/16/2018 with normal size.

 

IMPRESSION: There is no tracer uptake in the gallbladder consistent with obstruction of the cystic du
ct and cholecystitis. No focal liver defect. Common bile duct is not obstructed.

## 2018-02-01 NOTE — US
EXAMINATION TYPE: US gallbladder

 

DATE OF EXAM: 2/1/2018

 

COMPARISON: Previous exam dated 7/29/2016

 

CLINICAL HISTORY: R10.11Right upper quadrant pain,K81.8 Cholecystiti.

 

EXAM MEASUREMENTS:

 

Liver Length:  11.4 cm   

Gallbladder Wall:  0.2 cm   

CBD:  0.2 cm

Right Kidney:  9.1 x 4.3 x 4.6 cm

 

 

 

Pancreas:  Tail obscured by overlying bowel gas

Liver:  wnl  

Gallbladder:  Somewhat contracted on some views.

**Evidence for sonographic Wang's sign:  no

CBD:  wnl 

Right Kidney:  Inferior and superior pole obscured by overlying bowel gas 

 

There is no ascites.

 

IMPRESSION: There are some limitations to the exam. No significant abnormalities evident. Gallbladder
 appears contracted.

## 2018-02-12 ENCOUNTER — HOSPITAL ENCOUNTER (OUTPATIENT)
Dept: HOSPITAL 47 - OR | Age: 68
Discharge: HOME | End: 2018-02-12
Attending: SURGERY
Payer: MEDICARE

## 2018-02-12 VITALS — TEMPERATURE: 97 F | RESPIRATION RATE: 16 BRPM

## 2018-02-12 VITALS — BODY MASS INDEX: 17.3 KG/M2

## 2018-02-12 VITALS — SYSTOLIC BLOOD PRESSURE: 119 MMHG | DIASTOLIC BLOOD PRESSURE: 73 MMHG | HEART RATE: 80 BPM

## 2018-02-12 DIAGNOSIS — Z87.891: ICD-10-CM

## 2018-02-12 DIAGNOSIS — K81.1: Primary | ICD-10-CM

## 2018-02-12 DIAGNOSIS — M19.90: ICD-10-CM

## 2018-02-12 DIAGNOSIS — J44.9: ICD-10-CM

## 2018-02-12 DIAGNOSIS — Z88.6: ICD-10-CM

## 2018-02-12 DIAGNOSIS — K21.9: ICD-10-CM

## 2018-02-12 DIAGNOSIS — H93.19: ICD-10-CM

## 2018-02-12 PROCEDURE — 88304 TISSUE EXAM BY PATHOLOGIST: CPT

## 2018-02-12 PROCEDURE — 47562 LAPAROSCOPIC CHOLECYSTECTOMY: CPT

## 2018-02-12 RX ADMIN — HYDROMORPHONE HYDROCHLORIDE PRN MG: 1 INJECTION, SOLUTION INTRAMUSCULAR; INTRAVENOUS; SUBCUTANEOUS at 10:07

## 2018-02-12 RX ADMIN — HYDROMORPHONE HYDROCHLORIDE PRN MG: 1 INJECTION, SOLUTION INTRAMUSCULAR; INTRAVENOUS; SUBCUTANEOUS at 10:13

## 2018-02-12 RX ADMIN — MEPERIDINE HYDROCHLORIDE ONE MG: 50 INJECTION, SOLUTION INTRAMUSCULAR; INTRAVENOUS; SUBCUTANEOUS at 10:24

## 2018-02-12 RX ADMIN — MEPERIDINE HYDROCHLORIDE ONE MG: 50 INJECTION, SOLUTION INTRAMUSCULAR; INTRAVENOUS; SUBCUTANEOUS at 10:32

## 2018-02-12 NOTE — P.GSHP
History of Present Illness


H&P Date: 18


Chief Complaint: Right upper quadrant pain





This is a 67-year-old female who presents today for laparoscopic cholestatic.  

Her recent HIDA scan shows nonvisualization of the gallbladder suggestive 

cystic duct obstruction.  He presents today for laparoscopic ostectomy for 

chronic cholecystitis.





Past Medical History


Past Medical History: COPD, Osteoarthritis (OA), Pneumonia


Additional Past Medical History / Comment(s): DDD, bulging/herniated discs, 

sciatica, diverticulitis, bilateral tinnitis, past R hip fracture after slip/

fall on ice with surgery. hiatal hernia,


History of Any Multi-Drug Resistant Organisms: None Reported


Past Surgical History: Joint Replacement, Orthopedic Surgery


Additional Past Surgical History / Comment(s): Rt hip replacement, Rt knee 

arthroscopy, francisco hand cyst removals, bilateral carpal tunnel releases,


Past Anesthesia/Blood Transfusion Reactions: No Reported Reaction


Additional Past Anesthesia/Blood Transfusion Reaction / Comment(s): Pt has 

never received blood.


Smoking Status: Former smoker





- Past Family History


  ** Father


Additional Family Medical History / Comment(s): Father  of a head injury.





  ** Mother


Family Medical History: No Reported History


Additional Family Medical History / Comment(s): Mother is 89yrs old and has 

dementia.





Medications and Allergies


 Home Medications











 Medication  Instructions  Recorded  Confirmed  Type


 


HYDROcodone/APAP 10-325MG [Norco 1 tab PO BID PRN 16 History





]    


 


Calcium(Dose Unknown) 1 tab PO Q48H 18 History


 


Omeprazole [PriLOSEC] 40 mg PO AC-BRKFST PRN 18 History


 


Vitamin B Complex 1 each PO Q48H 18 History


 


Vitamin C/Biotin [Hair, Skin and 1 tab PO Q48H 18 History





Nails]    


 


Vitamin E (Dl,Tocopheryl Acet) 400 unit PO DAILY 18 History





[Vitamin E]    











 Allergies











Allergy/AdvReac Type Severity Reaction Status Date / Time


 


ibuprofen [From Motrin] AdvReac  Abdominal Verified 18 08:18





   Pain  


 


iodine AdvReac  Abdominal Verified 18 08:18





   Pain  














Surgical - Exam


 Vital Signs











Temp Pulse Resp BP Pulse Ox


 


 97.6 F   64   18   151/84   99 


 


 18 08:36  18 08:36  18 08:36  18 08:36  18 08:36














- General


well developed, no distress





- Eyes


PERRL





- ENT


normal pinna





- Neck


no masses





- Respiratory


normal expansion





- Cardiovascular


Rhythm: regular





- Abdomen


Abdomen: soft, non tender





Assessment and Plan


Assessment: 





Cystic duct obstruction





Chronically cholecystitis





We'll perform laparoscopic cholecystectomy.

## 2018-02-12 NOTE — P.OP
Date of Procedure: 02/12/18


Preoperative Diagnosis: 


Cholecystitis


Postoperative Diagnosis: 


Cholecystitis


Procedure(s) Performed: 


Laparoscopic cholecystectomy


Anesthesia: GISSELLE


Surgeon: Miguel Wong


Estimated Blood Loss (ml): 5


Pathology: other (Gallbladder)


Condition: stable


Disposition: PACU


Description of Procedure: 


The patient was placed on the operating table.   The patient received a general 

endotracheal tube anesthesia.    The patients abdomen was prepped and draped 

in the usual sterile fashion.    Through an infraumbilical stab incision, the 

fascia of the anterior abdominal wall was grasped with a pair of Kochers and 

then the Veress needle was placed in the peritoneal cavity.   Position of the 

Veress needle was confirmed with positive drop test.   The abdomen was then 

insufflated.  After adequate insufflation, the 10 mm trocar was placed in the 

peritoneal cavity.    Following this the laparoscope was placed in the 

peritoneal cavity. The patient was placed in the head-up, right side up 

position and then a 5 mm trocar was placed in the right lateral and right 

subcostal position under direct visualization.    A 8 mm trocar was placed in 

the epigastric position.  The gallbladder was grasped in the fundus and 

infundibulum.  Traction  on the gallbladder was placed in the lateral and the 

cephalad positions.  The triangle of Calot  was visualized..   The cystic duct 

was bluntly dissected until the union of the cystic duct and common bile duct 

was seen.    The cystic duct was then divided and sealed with the Harmonic 

scissors.  A PDS Endoloop was then placed throughout the cystic duct stump.  

The cystic artery divided and sealed with the Harmonic scissors.   The 

gallbladder was then removed from the liver bed using Harmonic scissors.   The 

gallbladder was then extracted through the epigastric port site.  Operative 

field was checked for any bleeding spots and Harmonic scissors was used to 

coagulate the liver bed.    The abdomen was irrigated.   The trocars were 

removed.  The skin was closed using interrupted 3-0 Vicryl suture.   Dermabond 

dressing were applied.   The patient tolerated the procedure well.

## 2018-10-30 ENCOUNTER — HOSPITAL ENCOUNTER (EMERGENCY)
Dept: HOSPITAL 47 - EC | Age: 68
Discharge: LEFT BEFORE DISCHARGE | End: 2018-10-30
Payer: MEDICARE

## 2018-10-30 VITALS
SYSTOLIC BLOOD PRESSURE: 207 MMHG | HEART RATE: 86 BPM | TEMPERATURE: 98 F | RESPIRATION RATE: 18 BRPM | DIASTOLIC BLOOD PRESSURE: 104 MMHG

## 2018-10-30 DIAGNOSIS — Z96.641: ICD-10-CM

## 2018-10-30 DIAGNOSIS — Z87.39: ICD-10-CM

## 2018-10-30 DIAGNOSIS — Z53.29: ICD-10-CM

## 2018-10-30 DIAGNOSIS — Z91.048: ICD-10-CM

## 2018-10-30 DIAGNOSIS — Z88.6: ICD-10-CM

## 2018-10-30 DIAGNOSIS — M19.90: ICD-10-CM

## 2018-10-30 DIAGNOSIS — Z98.890: ICD-10-CM

## 2018-10-30 DIAGNOSIS — M25.432: ICD-10-CM

## 2018-10-30 DIAGNOSIS — M25.532: Primary | ICD-10-CM

## 2018-10-30 DIAGNOSIS — Z87.891: ICD-10-CM

## 2018-10-30 PROCEDURE — 73110 X-RAY EXAM OF WRIST: CPT

## 2018-10-30 PROCEDURE — 96372 THER/PROPH/DIAG INJ SC/IM: CPT

## 2018-10-30 PROCEDURE — 99284 EMERGENCY DEPT VISIT MOD MDM: CPT

## 2018-10-30 NOTE — XR
EXAMINATION TYPE: XR wrist complete LT

 

DATE OF EXAM: 10/30/2018

 

CLINICAL HISTORY: Left wrist pain and numbness.

 

TECHNIQUE:  Frontal, lateral and oblique images of the left wrist are obtained.

 

COMPARISON: None

 

FINDINGS:  There is no acute fracture/dislocation evident in the left wrist. There is joint space airam
rowing of the carpal carpal interspaces and first carpometacarpal joint as well as the radiocarpal eze
int with numerous osseous cysts of the carpal bones and opposing surface sclerosis of the radiocarpal
 joint as well as the carpometacarpal joint. Well-corticated osseous fragment likely sequela prior in
jury is seen at the ulnar styloid. There is diffuse soft tissue swelling around the wrist. No radiopa
que foreign body is seen.

 

IMPRESSION:  There is no acute fracture or dislocation in the left wrist. Generalized soft tissue swe
lling surrounding the left wrist and advanced arthropathy with distribution favoring rheumatoid arthr
itis although there is no erosion of the ulnar styloid.

## 2018-10-30 NOTE — ED
General Adult HPI





- General


Chief complaint: Extremity Problem,Nontraumatic


Stated complaint: left wrist pain


Time Seen by Provider: 10/30/18 10:00


Source: patient, RN notes reviewed


Mode of arrival: wheelchair


Limitations: no limitations





- History of Present Illness


Initial comments: 





This is a 68-year-old female who comes in complaining of left posterior wrist 

pain and swelling.  Patient states she had surgery on that wrist for ganglion 

cyst years ago by Dr. Mac.  Patient states last night she was doing a lot 

of moving at home and it became very painful and swollen posteriorly.  Patient 

denies any direct injury or trauma.  Patient denies any hand or finger pain.  

Patient states it hurts somewhat she's unable to flexors extend her wrist.  

Patient denies any fever chills per patient denies any redness or streaking.





- Related Data


 Home Medications











 Medication  Instructions  Recorded  Confirmed


 


HYDROcodone/APAP 10-325MG [Norco 1 tab PO BID PRN 07/29/16 10/30/18





]   











 Allergies











Allergy/AdvReac Type Severity Reaction Status Date / Time


 


ibuprofen [From Motrin] AdvReac  Abdominal Verified 10/30/18 10:16





   Pain  


 


iodine AdvReac  Abdominal Verified 10/30/18 10:16





   Pain  














Review of Systems


ROS Statement: 


Those systems with pertinent positive or pertinent negative responses have been 

documented in the HPI.





ROS Other: All systems not noted in ROS Statement are negative.





Past Medical History


Past Medical History: COPD, Osteoarthritis (OA), Pneumonia


Additional Past Medical History / Comment(s): DDD, bulging/herniated discs, 

sciatica, diverticulitis, bilateral tinnitis, past R hip fracture after slip/

fall on ice with surgery. hiatal hernia,


History of Any Multi-Drug Resistant Organisms: None Reported


Past Surgical History: Joint Replacement, Orthopedic Surgery


Additional Past Surgical History / Comment(s): Rt hip replacement, Rt knee 

arthroscopy, francisco hand cyst removals, bilateral carpal tunnel releases,


Past Anesthesia/Blood Transfusion Reactions: No Reported Reaction


Additional Past Anesthesia/Blood Transfusion Reaction / Comment(s): Pt has 

never received blood.


Past Psychological History: No Psychological Hx Reported


Smoking Status: Former smoker


Past Alcohol Use History: Daily


Past Drug Use History: Marijuana





- Past Family History


  ** Father


Additional Family Medical History / Comment(s): Father  of a head injury.





  ** Mother


Family Medical History: No Reported History


Additional Family Medical History / Comment(s): Mother is 89yrs old and has 

dementia.





General Exam





- General Exam Comments


Initial Comments: 





GENERAL 


Patient is well-developed and well-nourished.  Patient is in mild distress.





EYES


Patient's pupils are equal and round.  Extraocular motion is intact





SKIN


Unremarkable





NEURO


The patient is alert and oriented 3





PYSCH


Patient has normal interpersonal interactions.





MUSCULOSKELETAL


Right wrist is tender to palpation posteriorly and it's swollen posteriorly 

over the scaphoid region.  Patient had normal capillary refill.


Limitations: no limitations





Course


 Vital Signs











  10/30/18 10/30/18





  09:50 12:17


 


Temperature 97.7 F 


 


Pulse Rate 77 79


 


Respiratory 22 20





Rate  


 


Blood Pressure 228/82 201/106


 


O2 Sat by Pulse 100 100





Oximetry  














Medical Decision Making





- Medical Decision Making





X-ray showed no acute abnormality.





Patient was still in quite a bit of pain I had the physician assistant from 

[facility] come over by the name of Margie she stated that they 

could keep the patient in the hospital but the patient refuses to stay in the 

hospital.  Patient was signing out AMA and she remains in quite a bit of pain.  

I told her if she is going to go home she needs to follow up tomorrow.  She was 

in agreement with this.





Disposition


Clinical Impression: 


 Wrist pain, acute





Disposition: Left Against Medical Advice


Instructions:  Swollen Joint (ED)


Additional Instructions: 


Patient is to follow-up with [facility] tomorrow but call today for 

an appointment.  Patient is aware of this she states she will do it.  Patient 

will not be given any steroids to go home with because she got a dose of the 

emergency department and [facility] can continue that medication as 

they see fit.


Is patient prescribed a controlled substance at d/c from ED?: No


Referrals: 


None,Stated [Primary Care Provider] - 1-2 days


Time of Disposition: 13:19

## 2020-07-03 ENCOUNTER — HOSPITAL ENCOUNTER (EMERGENCY)
Dept: HOSPITAL 47 - EC | Age: 70
Discharge: HOME | End: 2020-07-03
Payer: MEDICARE

## 2020-07-03 VITALS — HEART RATE: 84 BPM | TEMPERATURE: 98 F

## 2020-07-03 VITALS — DIASTOLIC BLOOD PRESSURE: 71 MMHG | RESPIRATION RATE: 18 BRPM | SYSTOLIC BLOOD PRESSURE: 107 MMHG

## 2020-07-03 DIAGNOSIS — Z87.81: ICD-10-CM

## 2020-07-03 DIAGNOSIS — M19.90: ICD-10-CM

## 2020-07-03 DIAGNOSIS — Z91.048: ICD-10-CM
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
## 2020-07-03 DIAGNOSIS — Z88.5: ICD-10-CM

## 2020-07-03 DIAGNOSIS — Z79.891: ICD-10-CM

## 2020-07-03 DIAGNOSIS — Z98.890: ICD-10-CM

## 2020-07-03 DIAGNOSIS — Z87.891: ICD-10-CM
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
## 2020-07-03 DIAGNOSIS — F10.129: ICD-10-CM

## 2020-07-03 DIAGNOSIS — R55: ICD-10-CM

## 2020-07-03 DIAGNOSIS — Z96.641: ICD-10-CM

## 2020-07-03 DIAGNOSIS — S32.591A: Primary | ICD-10-CM

## 2020-07-03 DIAGNOSIS — Z23: ICD-10-CM

## 2020-07-03 LAB
ALBUMIN SERPL-MCNC: 4.5 G/DL (ref 3.5–5)
ALP SERPL-CCNC: 131 U/L (ref 38–126)
ALT SERPL-CCNC: 176 U/L (ref 4–34)
ANION GAP SERPL CALC-SCNC: 21 MMOL/L
AST SERPL-CCNC: 122 U/L (ref 14–36)
BASOPHILS # BLD AUTO: 0 K/UL (ref 0–0.2)
BASOPHILS NFR BLD AUTO: 0 %
BUN SERPL-SCNC: 13 MG/DL (ref 7–17)
CALCIUM SPEC-MCNC: 9.1 MG/DL (ref 8.4–10.2)
CHLORIDE SERPL-SCNC: 98 MMOL/L (ref 98–107)
CO2 SERPL-SCNC: 15 MMOL/L (ref 22–30)
EOSINOPHIL # BLD AUTO: 0 K/UL (ref 0–0.7)
EOSINOPHIL NFR BLD AUTO: 1 %
ERYTHROCYTE [DISTWIDTH] IN BLOOD BY AUTOMATED COUNT: 4.31 M/UL (ref 3.8–5.4)
ERYTHROCYTE [DISTWIDTH] IN BLOOD: 13.3 % (ref 11.5–15.5)
GLUCOSE SERPL-MCNC: 126 MG/DL (ref 74–99)
HCT VFR BLD AUTO: 42.3 % (ref 34–46)
HGB BLD-MCNC: 13.9 GM/DL (ref 11.4–16)
LYMPHOCYTES # SPEC AUTO: 0.4 K/UL (ref 1–4.8)
LYMPHOCYTES NFR SPEC AUTO: 11 %
MCH RBC QN AUTO: 32.3 PG (ref 25–35)
MCHC RBC AUTO-ENTMCNC: 32.8 G/DL (ref 31–37)
MCV RBC AUTO: 98.2 FL (ref 80–100)
MONOCYTES # BLD AUTO: 0.1 K/UL (ref 0–1)
MONOCYTES NFR BLD AUTO: 2 %
NEUTROPHILS # BLD AUTO: 3.5 K/UL (ref 1.3–7.7)
NEUTROPHILS NFR BLD AUTO: 85 %
PLATELET # BLD AUTO: 248 K/UL (ref 150–450)
POTASSIUM SERPL-SCNC: 3.8 MMOL/L (ref 3.5–5.1)
PROT SERPL-MCNC: 7.7 G/DL (ref 6.3–8.2)
SODIUM SERPL-SCNC: 134 MMOL/L (ref 137–145)
WBC # BLD AUTO: 4.1 K/UL (ref 3.8–10.6)

## 2020-07-03 PROCEDURE — 99284 EMERGENCY DEPT VISIT MOD MDM: CPT

## 2020-07-03 PROCEDURE — 36415 COLL VENOUS BLD VENIPUNCTURE: CPT

## 2020-07-03 PROCEDURE — 90471 IMMUNIZATION ADMIN: CPT

## 2020-07-03 PROCEDURE — 85025 COMPLETE CBC W/AUTO DIFF WBC: CPT

## 2020-07-03 PROCEDURE — 90715 TDAP VACCINE 7 YRS/> IM: CPT

## 2020-07-03 PROCEDURE — 72125 CT NECK SPINE W/O DYE: CPT

## 2020-07-03 PROCEDURE — 93005 ELECTROCARDIOGRAM TRACING: CPT

## 2020-07-03 PROCEDURE — 70450 CT HEAD/BRAIN W/O DYE: CPT

## 2020-07-03 PROCEDURE — 80053 COMPREHEN METABOLIC PANEL: CPT

## 2020-07-03 PROCEDURE — 71045 X-RAY EXAM CHEST 1 VIEW: CPT

## 2020-07-03 PROCEDURE — 73700 CT LOWER EXTREMITY W/O DYE: CPT

## 2020-07-03 PROCEDURE — 80320 DRUG SCREEN QUANTALCOHOLS: CPT

## 2020-07-03 PROCEDURE — 72170 X-RAY EXAM OF PELVIS: CPT

## 2020-07-03 NOTE — CT
EXAMINATION TYPE: CT brain martelline wo con

 

DATE OF EXAM: 7/3/2020

 

COMPARISON: None

 

HISTORY: fall down stairs

Headache. Neck pain

CT DLP: 1294.70 mGycm

Automated exposure control for dose reduction was used.

 

Ventricles and sulci appear normal. There is no mass effect nor midline shift. There is no sign of in
tracranial hemorrhage. The calvarium is intact. Skull base is intact.

 

Cervical vertebra show a mild subluxation deformity at C4-5 of 5 mm. There is developmentally adequat
e spinal canal. The posterior elements are intact. There is mild cervical facet arthropathy. There is
 no compression fracture. There is narrowing of C5-6 and C6-7 disc spaces.

 

IMPRESSION:

Negative CT scan of the brain.

 

Spondylolisthesis at C4-5. No cervical spine fracture.

## 2020-07-03 NOTE — XR
EXAMINATION TYPE: XR chest 1V portable

 

DATE OF EXAM: 7/3/2020

 

COMPARISON: 2/10/2014

 

HISTORY: Fall. Pain.

 

TECHNIQUE:

 

FINDINGS: There is no heart failure nor confluent pneumonic infiltrate. There is no sign of pleural e
ffusion or pneumothorax. Exam limited by the backboard. The bony thorax is intact. Trachea is midline
. Heart size is normal.

 

IMPRESSION: No active cardiopulmonary disease. No change.

## 2020-07-03 NOTE — XR
EXAMINATION TYPE: XR pelvis AP view

 

DATE OF EXAM: 7/3/2020

 

COMPARISON: NONE

 

HISTORY:

Fall. Pain.

TECHNIQUE: Single view

 

FINDINGS: There is right hip prosthesis. There is fracture of the right inferior pubic ramus. There i
s some deformity of the medial acetabulum and probably also fracture of the superior pubic ramus. Exa
m is limited by the backboard-single view only exam. The sacroiliac joints are intact.

 

IMPRESSION: Right-sided pubic ramus fracture. This appears new compared to 2/3/2014 hip x-ray.

BMB

## 2022-07-25 NOTE — CT
EXAMINATION TYPE: CT hip RT wo con

 

DATE OF EXAM: 7/3/2020

 

COMPARISON: None

 

HISTORY: possible acetabulum fracture

 

CT DLP: 367.8 mGycm

Automated exposure control for dose reduction was used.

 

Multiple axial sections were obtained from the mid acetabulum to the proximal shaft of the femur with
out contrast.

 

There is metal artifact from the hip prosthesis and this obscures detail of the bone of the acetabulu
m. There is a nondisplaced right inferior pubic ramus fracture. The right superior pubic ramus shows 
no evidence of a displaced fracture. The proximal femur is intact. There is limited visualization of 
the upper and mid pelvis. There appears to be 6 cm rounded mass involving the anterior abdominal wall
 that could be abdominal wall hematoma.

 

IMPRESSION:

Exam fails to show an acetabular fracture. There is right inferior pubic ramus fracture. There is par
tial visualization of a high density irregular 6 cm mass that appears to be extraperitoneal above the
 urinary bladder and related to abdominal wall hematoma. Washed forehead abrasion with gauze and normal saline.

## 2023-06-20 NOTE — ED
Fall HPI





- General


Chief Complaint: Fall


Stated Complaint: Fall


Time Seen by Provider: 20 03:33


Source: EMS


Mode of arrival: EMS


Limitations: altered mental status





- History of Present Illness


MD Complaint: fall


-: minutes(s)


Fall From: down stairs (#)


When Fall Occurred: just prior to arrival


Fall Witnessed: yes, by family


Place Fall Occurred: home


Loss of Consciousness: none


Prolonged Down Time?: no


Symptoms Prior to Fall: none


Context: alcohol use





- Related Data


                                Home Medications











 Medication  Instructions  Recorded  Confirmed


 


Ascorbic Acid [Vitamin C] 500 mg PO DAILY 20


 


Biotin 5 mg PO DAILY 20


 


Cholecalciferol [Vitamin D3 (25 1,000 unit PO DAILY 20





Mcg = 1000 Iu)]   


 


HYDROcodone/APAP 7.5-325MG [Norco 1 tab PO BID PRN 20





7.5-325]   


 


Vitamin B Complex 1 cap PO DAILY 20








                                  Previous Rx's











 Medication  Instructions  Recorded


 


Hydrocodone/Acetaminophen [Norco 1 each PO Q6HR PRN #20 tab 20





5-325]  











                                    Allergies











Allergy/AdvReac Type Severity Reaction Status Date / Time


 


ibuprofen [From Motrin] AdvReac  Abdominal Verified 20 06:52





   Pain  


 


iodine AdvReac  Abdominal Verified 20 06:52





   Pain  














Review of Systems


ROS Statement: 


Those systems with pertinent positive or pertinent negative responses have been 

documented in the HPI.





ROS Other: All systems not noted in ROS Statement are negative.


Limitations: ROS unobtainable due to patients medical condition





Past Medical History


Past Medical History: COPD, Osteoarthritis (OA), Pneumonia


Additional Past Medical History / Comment(s): DDD, bulging/herniated discs, 

sciatica, diverticulitis, bilateral tinnitis, past R hip fracture after 

slip/fall on ice with surgery. hiatal hernia,


History of Any Multi-Drug Resistant Organisms: None Reported


Past Surgical History: Joint Replacement, Orthopedic Surgery


Additional Past Surgical History / Comment(s): Rt hip replacement, Rt knee 

arthroscopy, francisco hand cyst removals, bilateral carpal tunnel releases,


Past Anesthesia/Blood Transfusion Reactions: No Reported Reaction


Additional Past Anesthesia/Blood Transfusion Reaction / Comment(s): Pt has never

received blood.


Past Psychological History: No Psychological Hx Reported


Smoking Status: Former smoker


Past Alcohol Use History: Daily


Past Drug Use History: Marijuana





- Past Family History


  ** Father


Additional Family Medical History / Comment(s): Father  of a head injury.





  ** Mother


Family Medical History: No Reported History


Additional Family Medical History / Comment(s): Mother is 89yrs old and has 

dementia.





General Exam


Limitations: altered mental status


General appearance: obtunded


Head exam: Present: atraumatic, normocephalic


Eye exam: Present: normal appearance.  Absent: scleral icterus, conjunctival 

injection, periorbital swelling, periorbital tenderness


ENT exam: Present: normal oropharynx, TM's normal bilaterally, normal external 

ear exam, other (Swelling and mild tenderness to base of nose.)


Neck exam: Present: normal inspection, other (Cervical collar)


Respiratory exam: Present: normal lung sounds bilaterally.  Absent: respiratory 

distress, wheezes, rales, rhonchi, stridor, chest wall tenderness


Cardiovascular Exam: Present: regular rate, normal rhythm, normal heart sounds. 

Absent: systolic murmur, diastolic murmur, rubs, gallop


GI/Abdominal exam: Present: soft.  Absent: distended, tenderness, guarding, re

bound, rigid, mass


Extremities exam: Present: normal inspection, normal capillary refill.  Absent: 

pedal edema, calf tenderness


Back exam: Present: normal inspection.  Absent: CVA tenderness (R), CVA 

tenderness (L), vertebral tenderness


Neurological exam: Present: altered


Skin exam: Present: warm, dry, intact, normal color, abrasion.  Absent: rash





Course


                                   Vital Signs











  20





  03:31 05:37


 


Temperature 94.6 F L 


 


Pulse Rate 88 83


 


Respiratory 12 16





Rate  


 


Blood Pressure 152/85 133/85


 


O2 Sat by Pulse 98 97





Oximetry  














Medical Decision Making





- Medical Decision Making





This patient is a 69-year-old woman brought by ambulance to have evaluation 

after she had a fall.  On the scene they were able to obtain the history that 

the patient had been drinking and then tumbled down approximately 12 or 13 

stairs.  They report that the patient was initially somewhat belligerent and c

ombative though she was able to speak clearly.  EMS did end up placing patient 

in cervical collar and backboard precautions and then they did start an IV and 

reportedly gave Versed 2.5 mg as the patient was attempting to strike them.  As 

result when the patient is here she is not able to give any history.  The 

patient is therefore sent for CT and also had the x-rays which did reveal pubic 

ramus fracture.  As the Versed wore off and the patient became more alert she 

denied any other complaints then the pain to the right side of her pelvis when 

she would attempt to bear weight.  Patient has previously seen Dr. Shultz for hip

surgery.  I was going to admit the patient to also have orthopedic consultation,

but the patient states that she is feeling much better and wants to go home.  

She understands that she must use a walker and not be weightbearing right leg.  

I discussed case with the orthopedic service physician assistants, and they will

have the patient in the clinic.  Patient understands that she needs to return 

here if there is any change in how she is feeling, she develops any new 

symptoms, or if there is worsening in any way.





- Lab Data


Result diagrams: 


                                 20 03:42





                                 20 03:42


                                   Lab Results











  20 Range/Units





  03:42 03:42 


 


WBC  4.1   (3.8-10.6)  k/uL


 


RBC  4.31   (3.80-5.40)  m/uL


 


Hgb  13.9   (11.4-16.0)  gm/dL


 


Hct  42.3   (34.0-46.0)  %


 


MCV  98.2   (80.0-100.0)  fL


 


MCH  32.3   (25.0-35.0)  pg


 


MCHC  32.8   (31.0-37.0)  g/dL


 


RDW  13.3   (11.5-15.5)  %


 


Plt Count  248   (150-450)  k/uL


 


Neutrophils %  85   %


 


Lymphocytes %  11   %


 


Monocytes %  2   %


 


Eosinophils %  1   %


 


Basophils %  0   %


 


Neutrophils #  3.5   (1.3-7.7)  k/uL


 


Lymphocytes #  0.4 L   (1.0-4.8)  k/uL


 


Monocytes #  0.1   (0-1.0)  k/uL


 


Eosinophils #  0.0   (0-0.7)  k/uL


 


Basophils #  0.0   (0-0.2)  k/uL


 


Sodium   134 L  (137-145)  mmol/L


 


Potassium   3.8  (3.5-5.1)  mmol/L


 


Chloride   98  ()  mmol/L


 


Carbon Dioxide   15 L  (22-30)  mmol/L


 


Anion Gap   21  mmol/L


 


BUN   13  (7-17)  mg/dL


 


Creatinine   0.53  (0.52-1.04)  mg/dL


 


Est GFR (CKD-EPI)AfAm   >90  (>60 ml/min/1.73 sqM)  


 


Est GFR (CKD-EPI)NonAf   >90  (>60 ml/min/1.73 sqM)  


 


Glucose   126 H  (74-99)  mg/dL


 


Calcium   9.1  (8.4-10.2)  mg/dL


 


Total Bilirubin   0.7  (0.2-1.3)  mg/dL


 


AST   122 H  (14-36)  U/L


 


ALT   176 H  (4-34)  U/L


 


Alkaline Phosphatase   131 H  ()  U/L


 


Total Protein   7.7  (6.3-8.2)  g/dL


 


Albumin   4.5  (3.5-5.0)  g/dL


 


Serum Alcohol   215 H*  mg/dL














- EKG Data


-: EKG Interpreted by Me


EKG shows normal: sinus rhythm (Rate 81 bpm), axis (Normal), intervals (Normal),

QRS complexes (Normal), ST-T waves


Rate: normal


Interpretation: [facility]





Disposition


Clinical Impression: 


 Fall, Pubic bone fracture, Pre-syncope, Alcohol intoxication





Disposition: HOME SELF-CARE


Condition: Fair


Instructions (If sedation given, give patient instructions):  Nasal Fracture 

(ED), Pelvic Fracture (ED), Alcohol Intoxication (ED), Fall Prevention (ED)


Prescriptions: 


Hydrocodone/Acetaminophen [Norco 5-325] 1 each PO Q6HR PRN #20 tab


 PRN Reason: Pain


Is patient prescribed a controlled substance at d/c from ED?: Yes


When asked, does pt state using other controlled substances?: No


If prescribed controlled substance>3 days was MAPS reviewed?: Prescribed <3 Days


If opioid is for acute pain is fill amount 7 days or less?: Yes


If Rx opioid, was Start Talking consent form obtained?: Yes


Referrals: 


None,Stated [Primary Care Provider] - 1-2 days Patient's spouse is calling the office back.